# Patient Record
Sex: MALE | Race: WHITE | Employment: OTHER | URBAN - METROPOLITAN AREA
[De-identification: names, ages, dates, MRNs, and addresses within clinical notes are randomized per-mention and may not be internally consistent; named-entity substitution may affect disease eponyms.]

---

## 2022-02-04 ENCOUNTER — APPOINTMENT (OUTPATIENT)
Dept: GENERAL RADIOLOGY | Age: 62
End: 2022-02-04
Attending: PHYSICIAN ASSISTANT
Payer: MEDICARE

## 2022-02-04 ENCOUNTER — HOSPITAL ENCOUNTER (EMERGENCY)
Age: 62
Discharge: HOME | End: 2022-02-04
Attending: EMERGENCY MEDICINE
Payer: MEDICARE

## 2022-02-04 VITALS
SYSTOLIC BLOOD PRESSURE: 145 MMHG | HEART RATE: 81 BPM | BODY MASS INDEX: 24.25 KG/M2 | HEIGHT: 68 IN | RESPIRATION RATE: 16 BRPM | WEIGHT: 160 LBS | DIASTOLIC BLOOD PRESSURE: 91 MMHG | OXYGEN SATURATION: 100 % | TEMPERATURE: 97.5 F
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
## 2022-02-04 DIAGNOSIS — S81.811A LACERATION OF RIGHT LOWER EXTREMITY EXCLUDING THIGH, INITIAL ENCOUNTER: ICD-10-CM

## 2022-02-04 PROCEDURE — 75810000293 HC SIMP/SUPERF WND  RPR

## 2022-02-04 PROCEDURE — 73590 X-RAY EXAM OF LOWER LEG: CPT

## 2022-02-04 PROCEDURE — 99283 EMERGENCY DEPT VISIT LOW MDM: CPT

## 2022-02-04 RX ORDER — ACETAMINOPHEN 500 MG
1000 TABLET ORAL
COMMUNITY

## 2022-02-04 RX ORDER — GABAPENTIN 300 MG/1
300 CAPSULE ORAL 3 TIMES DAILY
COMMUNITY
Start: 2021-03-12
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
## 2022-02-04 NOTE — ED NOTES
Sutures placed to laceration below right knee by GEOFFREY Aparicio. Pt instructed by PA on care and removal after 10-14 days. Pt verbalized understanding.
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
## 2022-02-04 NOTE — ED NOTES
I have reviewed discharge instructions with the patient. The patient verbalized understanding. Patient left ED via Discharge Method: ambulatory to Home with self. Opportunity for questions and clarification provided. Patient given 0 scripts. To continue your aftercare when you leave the hospital, you may receive an automated call from our care team to check in on how you are doing. This is a free service and part of our promise to provide the best care and service to meet your aftercare needs.  If you have questions, or wish to unsubscribe from this service please call [phone]. Thank you for Choosing our [facility] Emergency Department.

## 2022-02-04 NOTE — DISCHARGE INSTRUCTIONS
Apply Neosporin with bandage twice daily. Watch for signs of infection. Do not sit in a hot tub with sutures.   Return in 10 to 14 days for suture removal.

## 2022-09-08 ENCOUNTER — HOSPITAL ENCOUNTER (EMERGENCY)
Age: 62
Discharge: HOME | End: 2022-09-08
Attending: EMERGENCY MEDICINE
Payer: MEDICAID

## 2022-09-08 ENCOUNTER — HOSPITAL ENCOUNTER (EMERGENCY)
Dept: GENERAL RADIOLOGY | Age: 62
Discharge: HOME | End: 2022-09-11
Payer: MEDICAID

## 2022-09-08 VITALS
WEIGHT: 160 LBS | HEIGHT: 68 IN | DIASTOLIC BLOOD PRESSURE: 88 MMHG | TEMPERATURE: 98.5 F | SYSTOLIC BLOOD PRESSURE: 162 MMHG | HEART RATE: 80 BPM | OXYGEN SATURATION: 98 % | BODY MASS INDEX: 24.25 KG/M2 | RESPIRATION RATE: 16 BRPM

## 2022-09-08 DIAGNOSIS — S62.102A CLOSED FRACTURE OF LEFT WRIST, INITIAL ENCOUNTER: Primary | ICD-10-CM
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
## 2022-09-08 PROCEDURE — 99283 EMERGENCY DEPT VISIT LOW MDM: CPT

## 2022-09-08 PROCEDURE — 6370000000 HC RX 637 (ALT 250 FOR IP): Performed by: NURSE PRACTITIONER

## 2022-09-08 PROCEDURE — 73130 X-RAY EXAM OF HAND: CPT

## 2022-09-08 RX ORDER — IBUPROFEN 600 MG/1
600 TABLET ORAL EVERY 6 HOURS PRN
Status: DISCONTINUED | OUTPATIENT
Start: 2022-09-08 | End: 2022-09-08 | Stop reason: HOSPADM

## 2022-09-08 RX ORDER — HYDROCODONE BITARTRATE AND ACETAMINOPHEN 5; 325 MG/1; MG/1
1 TABLET ORAL EVERY 6 HOURS PRN
Qty: 10 TABLET | Refills: 0 | Status: SHIPPED | OUTPATIENT
Start: 2022-09-08 | End: 2022-09-11

## 2022-09-08 RX ORDER — ACETAMINOPHEN 500 MG
1000 TABLET ORAL
Status: COMPLETED | OUTPATIENT
Start: 2022-09-08 | End: 2022-09-08

## 2022-09-08 RX ORDER — OXYCODONE HYDROCHLORIDE 5 MG/1
5 TABLET ORAL EVERY 4 HOURS PRN
Status: DISCONTINUED | OUTPATIENT
Start: 2022-09-08 | End: 2022-09-08 | Stop reason: HOSPADM

## 2022-09-08 RX ORDER — TETANUS AND DIPHTHERIA TOXOIDS ADSORBED 2; 2 [LF]/.5ML; [LF]/.5ML
0.5 INJECTION INTRAMUSCULAR
Status: DISCONTINUED | OUTPATIENT
Start: 2022-09-08 | End: 2022-09-08 | Stop reason: HOSPADM

## 2022-09-08 RX ADMIN — OXYCODONE HYDROCHLORIDE 5 MG: 5 TABLET ORAL at 15:34

## 2022-09-08 RX ADMIN — ACETAMINOPHEN 1000 MG: 500 TABLET, FILM COATED ORAL at 15:34

## 2022-09-08 ASSESSMENT — PAIN SCALES - GENERAL
PAINLEVEL_OUTOF10: 6
PAINLEVEL_OUTOF10: 7

## 2022-09-08 ASSESSMENT — PAIN DESCRIPTION - DESCRIPTORS: DESCRIPTORS: ACHING;THROBBING

## 2022-09-08 ASSESSMENT — PAIN DESCRIPTION - LOCATION: LOCATION: WRIST

## 2022-09-08 ASSESSMENT — PAIN DESCRIPTION - ORIENTATION: ORIENTATION: LEFT

## 2022-09-08 NOTE — ED TRIAGE NOTES
Patient ambulatory to triage with mask in place.  Patient reports he tripped and fell today and injured his left wrist

## 2022-09-08 NOTE — ED NOTES
I have reviewed discharge instructions with the patient. The patient verbalized understanding. Patient left ED via Discharge Method: ambulatory to Home with self. Opportunity for questions and clarification provided. Patient given 1 scripts. To continue your aftercare when you leave the hospital, you may receive an automated call from our care team to check in on how you are doing. This is a free service and part of our promise to provide the best care and service to meet your aftercare needs.  If you have questions, or wish to unsubscribe from this service please call [phone]. Thank you for Choosing our [address] [place] [address] Emergency Department.         Jenni San RN  09/08/22 2507

## 2022-09-08 NOTE — ED PROVIDER NOTES
[facility] Emergency Department Provider Note                   PCP:                VIKKI Valladares               Age: 58 y.o. Sex: male       ICD-10-CM    1. Closed fracture of left wrist, initial encounter  S62.102A HYDROcodone-acetaminophen (NORCO) 5-325 MG per tablet     [facility] Th[facility] Spica, Left      2. Fall from ground level  W18.30XA           DISPOSITION    Discharged    MDM     HPI as charted. Pt neck is supple, no meningeal signs. Lungs are clear to auscultation, no diminished sounds. Abdomen is soft and not tender. Pain or tenderness, weightbearing and ambulatory without any difficulty or pain. No sign of head injury, no neck or back pain, neck and back are nontender. No raccoon eyes or blandon signs, no tenderness, crepitus, instability, hematoma. No chest wall tenderness. Tenderness and swelling at the left wrist and hand, is neurovascular intact, no other sign of injury noted. I have offered tetanus booster which she accepts. Obtain radiograph of the hand and wrist which was notable for fractures as below. He declined offer of any additional imaging or management. Pain is well controlled with analgesia in the ED. I have discussed patient's with Andrew Green orthopedics PA via [facility] and he has recommended that as patient is neurovascularly intact and no open fracture, the patient be placed in Velcro wrist splint and discharged to follow-up with Ortho/hand. I will prescribe a short course of analgesia and discharge. Patient finding contact information for Ortho/hand and advised to call upon discharge today to schedule earliest possible follow-up. Strict return to ED for care instruction provided. Advised to follow-up with PCP in 1-2 days. Return to ED immediately for any new, worsening, concerning symptoms. Patient is very well-hydrated appearing, no distress, pleasant and conversational.  Nontoxic-appearing, tolerating oral intake. Patient is hemodynamically stable and in no acute distress. Patient is not ill-appearing. All findings and plans were discussed with the patient. Patient verbalizes desire to be discharged home at this time. All questions answered. Discussed with the patient that an unremarkable evaluation in the ED does not preclude the development or presence of a serious or life threatening condition. Patient was instructed to return immediately for any worsening or change in current symptoms, or if symptoms do not continue to improve. I instructed them to follow up with their primary care provider, own specialist, or medical provider that I am recommending for him within the next 2-3 days     the patient acknowledged understanding plan of care and affirmed approval. Patient is discharged home, with no further complaint. No orders of the defined types were placed in this encounter. Kati Hummel is a 58 y.o. male who presents to the Emergency Department with chief complaint of  No chief complaint on file. HPI  Pleasant and well-appearing 80-year-old male presents to the ED with complaint of the wrist pain and swelling subsequent to mechanical ground-level fall that occurred earlier this morning. States he was walking out of a convenience store when he tripped and fell onto the pavement, breaking his fall with extending the left hand and wrist.  Localized pain to the left wrist and the base of the hand, denies other pain, denies numbness/tingling/weakness. Denies striking head, loss of conscious, headache, blurred vision, neck or back pain, other pain complaints and all other complaint. Denies syncope or near syncope, dizziness, chest pain, diaphoresis, shortness of breath or prodrome, states he had a mechanical fall. No known therapeutic measures but nothing to make worse or better. Denies fever/chills, change in appetite, weight loss, decreased oral intake, blurred vision, headaches, neck pain/stiffness. Alert & oriented x 3, afebrile, hemodynamically stable, non-toxic appearing, appears in no distress. Medical/surgical/social history reviewed with the patient. Review of Systems  Constitutional: Negative for fever. Negative for appetite change, chills, diaphoresis and unexpected weight change. HENT: As in HPI     Eyes: Negative. Respiratory: As in HPI   Cardiovascular: Negative. GI/: As in HPI      Musculoskeletal: As in HPI   Skin: Negative. Allergic/Immunologic: Negative. Neurological: Negative. No past medical history on file. No past surgical history on file. No family history on file. Social History     Socioeconomic History    Marital status:    Tobacco Use    Smoking status: Every Day     Packs/day: 1.00     Types: Cigarettes    Smokeless tobacco: Never   Substance and Sexual Activity    Alcohol use: Not Currently    Drug use: Yes     Types: Marijuana [place] Semen)         Patient has no allergy information on record. Previous Medications    ACETAMINOPHEN (TYLENOL) 500 MG TABLET    Take 1,000 mg by mouth every 6 hours as needed    GABAPENTIN (NEURONTIN) 300 MG CAPSULE    Take 300 mg by mouth 3 times daily. Vitals signs and nursing note reviewed. No data found. Physical Exam   Constitutional: Oriented to person, place, and time. Appears well-developed and well-nourished. No distress. HENT:    Head: Normocephalic and atraumatic. Right Ear: External ear normal.    Left Ear: External ear normal.    Nose: Nose normal.    Mouth/Throat: Mouth normal. Uvula midline, no erythema/exudates/edema. No drooling, no voice change. No pain with ROM of neck. Eyes: Conjunctivae are normal.   Neck: Supple. No tracheal deviation. Not tender, not rigid, no menningeal signs. Cardiovascular: Normal rate, intact distal pulses. Pulmonary/Chest: No respiratory distress. Abdominal: Soft. Musculoskeletal: No obvious deformity, erythema.   There is swelling of the left wrist, there is tenderness to the left wrist.  Range of motion of the hand is intact, able to make a fist, open and spread fingers, touch finger to thumb, but with pain. He has tenderness to the base of the hand, the wrist, skin is intact, is neurovascularly intact and there is no tenderness of the forearm, elbow, upper arm, shoulder or other. Distal pulses, cap refill less than 2 seconds, no pain or proportion, no pitting edema, no sensory deficit. No Laceration. Neurological: Alert and oriented to person, place, and time. Skin:  No abrasion, no lesion, no petechiae and no rash noted. Not diaphoretic. No cyanosis, erythema, or pallor. Psychiatric: Normal mood and affect. Behavior is normal.    Nursing note and vitals reviewed. Procedures      Labs Reviewed - No data to display     No orders to display                          Voice dictation software was used during the making of this note. This software is not perfect and grammatical and other typographical errors may be present. This note has not been completely proofread for errors.          MADDY Magana - KARINA  09/08/22 1927

## 2022-09-16 ENCOUNTER — OFFICE VISIT (OUTPATIENT)
Dept: ORTHOPEDIC SURGERY | Age: 62
End: 2022-09-16
Payer: MEDICARE

## 2022-09-16 DIAGNOSIS — S52.572A OTHER INTRAARTICULAR FRACTURE OF LOWER END OF LEFT RADIUS, INITIAL ENCOUNTER FOR CLOSED FRACTURE: Primary | ICD-10-CM

## 2022-09-16 PROBLEM — S52.552A OTHER EXTRAARTICULAR FRACTURE OF LOWER END OF LEFT RADIUS, INITIAL ENCOUNTER FOR CLOSED FRACTURE: Status: ACTIVE | Noted: 2022-09-16

## 2022-09-16 PROCEDURE — 99204 OFFICE O/P NEW MOD 45 MIN: CPT | Performed by: ORTHOPAEDIC SURGERY

## 2022-09-16 NOTE — LETTER
[address],[facility]  Phone: [phone]  Fax: [phone]    Cindy Milan MD        September 16, 2022     Patient: Flavio Almonte   YOB: 1960   Date of Visit: 9/16/2022       To Whom It May Concern: It is my medical opinion that Flavio Almonte can return back to work on Monday restricted to no lifting, pushing or pulling over 7 lbs with the left hand for 4 weeks. If you have any questions or concerns, please don't hesitate to call.     Sincerely,        Cindy Milan MD

## 2022-09-16 NOTE — PROGRESS NOTES
Orthopaedic Hand Clinic Note    Name: Kati Hummel  Age: 58 y.o. YOB: 1960  Gender: male  MRN: [id number]      CC: Patient referred for evaluation of hand pain    HPI: Kati Hummel is a 58 y.o. male right handed with a chief complaint of  left wrist pain. The injury occurred 1 week ago when the patient tripped coming out of a [employer] gas station and landed onto the left wrist. The pain is located diffusely about the wrist. Denies numbness or paresthesias of the fingers. Evaluation has included x-rays. Treatment to date has included splint. Denies loss of consciousness, head injury or neck pain. ROS/Meds/PSH/PMH/FH/SH: I personally reviewed the patients standard intake form. Pertinents are discussed in the HPI    Physical Examination:  General: Awake and alert. HEENT: Normocephalic, atraumatic  CV/Pulm: Breathing even and unlabored  Skin: No obvious rashes noted. Lymphatic: No obvious evidence of lymphedema or lymphadenopathy    Musculoskeletal Exam:  Examination of the left upper extremity demonstrates no open wounds. Negative Tinel's over left carpal tunnel. Sensation is intact throughout, cap refill in all fingers < 5 seconds. Finger motion is limited with moderate swelling of the hand and fingers. Tenderness over the distal radius. Positive tenderness over the ulnar aspect of the wrist. No tenderness at elbow, shoulder, clavicle or cervical spine. Imaging / Electrodiagnostic Tests:     XR of the left wrist from outside source was reviewed and independently interpreted, this demonstrates a distal radius fracture with 10 degrees of dorsal tilt, ulnar styloid base fracture, this is a hand x-ray so it is suboptimal for evaluation of the distal radius fracture. left Wrist XR: AP, Lateral, Oblique views     Clinical Indication:  1.  Other intraarticular fracture of lower end of left radius, initial encounter for closed fracture           Report: AP, lateral, oblique x-ray wrist XRs demonstrates distal radius fracture with 20 degrees of dorsal tilt and intra-articular extension with 2 mm step-off, ulnar styloid base fracture is again demonstrated    Impression: as above     Sabino Blanco MD         Assessment:   1. Other intraarticular fracture of lower end of left radius, initial encounter for closed fracture        Plan:   We discussed the diagnosis and different treatment options. We discussed observation, casting, further imaging, and surgical fixation and the risks, benefits and alternatives of all these options. After discussing in detail the patient elects to proceed with wrist brace, we will reassess in 1 week with repeat x-rays, I explained that he has a distal radius fracture with 20 degrees of dorsal tilt which is an indication for surgery, we also discussed that he has an articular step-off of 2 mm, we discussed distal radius fracture open reduction internal fixation surgery, however, patient reports that he is low demand and he would like to try conservative measures. Patient voiced accordance and understanding of the information provided and the formulated plan. All questions were answered to the patient's satisfaction during the encounter.     Sabino Blanco MD  Orthopaedic Surgery  09/16/22  10:47 AM

## 2022-09-23 ENCOUNTER — OFFICE VISIT (OUTPATIENT)
Dept: ORTHOPEDIC SURGERY | Age: 62
End: 2022-09-23
Payer: MEDICARE

## 2022-09-23 DIAGNOSIS — S52.572A OTHER INTRAARTICULAR FRACTURE OF LOWER END OF LEFT RADIUS, INITIAL ENCOUNTER FOR CLOSED FRACTURE: Primary | ICD-10-CM

## 2022-09-23 PROCEDURE — 99215 OFFICE O/P EST HI 40 MIN: CPT | Performed by: ORTHOPAEDIC SURGERY

## 2022-09-23 RX ORDER — PREGABALIN 150 MG/1
CAPSULE ORAL NIGHTLY
COMMUNITY
Start: 2022-08-28

## 2022-09-23 RX ORDER — GABAPENTIN 300 MG/1
300 CAPSULE ORAL 3 TIMES DAILY PRN
COMMUNITY

## 2022-09-23 RX ORDER — BACLOFEN 20 MG/1
TABLET ORAL NIGHTLY
COMMUNITY
Start: 2022-07-06

## 2022-09-23 NOTE — PERIOP NOTE
Patient verified name and . Order for consent was found in EHR and matches case posting; patient verifies procedure. Type 1B surgery, phone assessment complete. Orders were received. Labs per surgeon: none  Labs per anesthesia protocol: none    Patient answered medical/surgical history questions at their best of ability. All prior to admission medications documented in [facility]. Patient instructed to take the following medications the day of surgery according to anesthesia guidelines with a small sip of water: mehtadone. On the day before surgery please take Acetaminophen 1000mg in the morning and then again before bed. You may substitute for Tylenol 650 mg. Hold all vitamins 7 days prior to surgery and NSAIDS 5 days prior to surgery. Prescription meds to hold:none  Patient instructed on the following:    > Arrive at [facility], time of arrival to be called the day before by 1700  > NPO after midnight, unless otherwise indicated, including gum, mints, and ice chips  > Responsible adult must drive patient to the hospital, stay during surgery, and patient will need supervision 24 hours after anesthesia  > Use soap in shower the night before surgery and on the morning of surgery  > All piercings must be removed prior to arrival.    > Leave all valuables (money and jewelry) at home but bring insurance card and ID on DOS.   > You may be required to pay a deductible or co-pay on the day of your procedure. You can pre-pay by calling [phone] if your surgery is at the [facility] or [phone] if your surgery is at the [facility]. > Do not wear make-up, nail polish, lotions, cologne, perfumes, powders, or oil on skin. Artificial nails are not permitted.

## 2022-09-23 NOTE — PROGRESS NOTES
Orthopaedic Hand Clinic Note    Name: Sangita Jennings  Age: 58 y.o. YOB: 1960  Gender: male  MRN: [id number]      Follow up visit:   1. Other intraarticular fracture of lower end of left radius, initial encounter for closed fracture        HPI: Sangita Jennings is a 58 y.o. male who is following up for left distal radius fracture almost 2 weeks ago, he reports increased swelling and pain, severe difficulty with everyday life due to the injury. ROS/Meds/PSH/PMH/FH/SH: I personally reviewed the patients standard intake form. Pertinents are discussed in the HPI    Physical Examination:  General: Awake and alert. HEENT: Normocephalic, atraumatic  CV/Pulm: Breathing even and unlabored  Skin: No obvious rashes noted. Lymphatic: No obvious evidence of lymphedema or lymphadenopathy    Musculoskeletal Examination:  Examination on the left upper extremity demonstrates cap refill < 5 seconds in all fingers, moderate to severe swelling of the left wrist, very limited motion, tenderness palpation of the distal radius, negative Tinel at the carpal tunnel, normal sensation in all fingers. Imaging / Electrodiagnostic Tests:     left Wrist XR: AP, Lateral, Oblique views     Clinical Indication:  1. Other intraarticular fracture of lower end of left radius, initial encounter for closed fracture           Report: AP, lateral, oblique x-ray wrist XRs demonstrates intra-articular distal radius fracture with 50 degrees of dorsal tilt which is increased from 20 degrees a week ago    Impression: as above     dIa Feliz MD         Assessment:   1. Other intraarticular fracture of lower end of left radius, initial encounter for closed fracture        Plan:   We discussed the diagnosis and different treatment options. We discussed observation, therapy, antiinflammatory medications and other pertinent treatment modalities.     After discussing in detail the patient elects to proceed with left distal radius open reduction internal fixation. We discussed all treatment options for this fracture, his fracture has displaced significantly in 1 week, at this point he has 50 degrees of dorsal angulation, this will cause adaptive carpal instability with very high probability of chronic pain, after discussion of all treatment options patient elected to proceed with surgery. Patient understands risks and benefits of LEFT DISTAL RADIUS OPEN REDUCTION INTERNAL FIXATION including but not limited to nerve injury, vessel injury, infection, failure to achieve desired results and possible need for additional surgery. Patient understands and wishes to proceed with surgery. On Exam:   The patient is alert and oriented; ;   Lung auscultation is clear bilaterally   Heart has RRR without murmurs  . Patient voiced accordance and understanding of the information provided and the formulated plan. All questions were answered to the patient's satisfaction during the encounter.     Richa Marrufo MD  Orthopaedic Surgery  09/23/22  11:05 AM

## 2022-09-23 NOTE — DISCHARGE INSTRUCTIONS
Postoperative  Instructions:      Weightbearing or Lifting: You  are  not  allowed  to  lift  any  weight  or  bear  any  weight  on  the  surgical  extremity  until  cleared  by  your  surgeon. Dressing  instructions:    Keep  your  dressing  and/or  splint  clean  and  dry  at  all  times. It  will  be  removed  at  your  first  post-operative  appointment or therapy apppointment. Your  stitches  will  be  removed  at  this  visit. Showering  Instructions:  May  shower  But keep surgical dressing clean and dry until removed as explained above. Pain  Control:  - You  have  been  given  a  prescription  to  be  taken  as  directed  for  post-operative  pain  control. In  addition,  elevate  the  operative  extremity  above  the  heart  at  all  times  to  prevent  swelling  and  throbbing  pain. - If you develop constipation while taking narcotic pain medications (Norco, Hydrocodone, Percocet, Oxycodone, Dilaudid, Hydromorphone) take  over-the-counter  Colace,  100mg  by  mouth  twice  a  Day. - Nausea  is  a  common  side  effect  of  many  pain  medications. You  will  want  to  eat something  before  taking  your  pain  medicine  to  help  prevent  Nausea. - If  you  are  taking  a  prescription  pain  medication  that  contains  acetaminophen,  we  recommend  that  you  do  not  take  additional  over  the  counter  acetaminophen  (Tylenol®). Other  pain  relieving  options:   - Using  a  cold  pack  to  ice  the  affected  area  a  few  times  a  day  (15  to  20  minutes  at  a  time)  can  help  to  relieve  pain,  reduce  swelling  and  bruising.      - Elevation  of  the  affected  area  can  also  help  to  reduce  pain  and  swelling. Did  you  receive  a  nerve  Block? A  nerve  block  can  provide  pain  relief  for  one  hour  to  two  days  after  your  surgery.   As  long  as  the  nerve  block  is  working,  you  will  experience  little  or  no  sensation in  the  area  the  surgeon  operated  on. As  the  nerve  block  wears  off,  you  will  begin  to  experience  pain  or  discomfort. It  is  very  important  that  you  begin  taking  your  prescribed  pain  medication  before  the  nerve  block  fully  wears  off. The first sign that the nerve block is wearing off is tingling in your fingers. Treating  your  pain  at  the  first  sign  of  the  block  wearing  off  will  ensure  your  pain  is  better  controlled  and  more  tolerable  when  full-sensation  returns. Do  not  wait  until  the  pain  is  intolerable,  as  the  medicine  will  be  less  effective. It  is  better  to  treat  pain  in  advance  than  to  try  and  catch  up. General  Anesthesia or Sedation:      If  you  did  not  receive  a  nerve  block  during  your  surgery,  you  will  need  to  start  taking  your  pain  medication  shortly  after  your  surgery  and  should  continue  to  do  so  as  prescribed  by  your  surgeon. Please contact us through my chart or call  [phone]  with any concern and ask to speak with Dr Mario goins.    Concerning problems include:      -  Excessive  redness  of  the  incisions      -  Drainage  for  more  than  2  Days after surgery or any foul smelling drainage  -  Fever  of  more  than  101.5  F      Please  call  [phone]  if  you  do  not  receive  or  are  unsure  of  your  first  follow-up  appointment. You  should  see  the  doctor  10-14  days  after  your  Surgery. Thank you for choosing me and [address] [place] [address] for your care. I will go above and beyond to ensure you receive the best care possible. Serafin Humphrey MD, PhD    ACTIVITY  As tolerated and as directed by your doctor. Bathe or shower as directed by your doctor. DIET  Clear liquids until no nausea or vomiting; then light diet for the first day. Advance to regular diet on second day, unless your doctor orders otherwise.    If nausea and vomiting continues, call your doctor. PAIN  Take pain medication as directed by your doctor. Call your doctor if pain is NOT relieved by medication. DO NOT take aspirin of blood thinners unless directed by your doctor. DRESSING CARE       CALL YOUR DOCTOR IF   Excessive bleeding that does not stop after holding pressure over the area  Temperature of 101 degrees F or above  Excessive redness, swelling or bruising, and/ or green or yellow, smelly discharge from incision    AFTER ANESTHESIA   For the first 24 hours: DO NOT Drive, Drink alcoholic beverages, or Make important decisions. Be aware of dizziness following anesthesia and while taking pain medication. APPOINTMENT DATE/ TIME    YOUR DOCTOR'S PHONE NUMBER       DISCHARGE SUMMARY from Nurse    PATIENT INSTRUCTIONS:    After general anesthesia or intravenous sedation, for 24 hours or while taking prescription Narcotics:  Limit your activities  Do not drive and operate hazardous machinery  Do not make important personal or business decisions  Do  not drink alcoholic beverages  If you have not urinated within 8 hours after discharge, please contact your surgeon on call. *  Please give a list of your current medications to your Primary Care Provider. *  Please update this list whenever your medications are discontinued, doses are      changed, or new medications (including over-the-counter products) are added. *  Please carry medication information at all times in case of emergency situations. These are general instructions for a healthy lifestyle:    No smoking/ No tobacco products/ Avoid exposure to second hand smoke    Surgeon General's Warning:  Quitting smoking now greatly reduces serious risk to your health.     Obesity, smoking, and sedentary lifestyle greatly increases your risk for illness    A healthy diet, regular physical exercise & weight monitoring are important for maintaining a healthy lifestyle    You may be retaining fluid if you have a history of heart failure or if you experience any of the following symptoms:  Weight gain of 3 pounds or more overnight or 5 pounds in a week, increased swelling in our hands or feet or shortness of breath while lying flat in bed. Please call your doctor as soon as you notice any of these symptoms; do not wait until your next office visit. Recognize signs and symptoms of STROKE:    F-face looks uneven    A-arms unable to move or move unevenly    S-speech slurred or non-existent    T-time-call 911 as soon as signs and symptoms begin-DO NOT go       Back to bed or wait to see if you get better-TIME IS BRAIN.

## 2022-09-23 NOTE — H&P (VIEW-ONLY)
[facility] Note    Name: Sandra Hussein  Age: 58 y.o. YOB: 1960  Gender: male  MRN: [id number]      Follow up visit:   1. Other intraarticular fracture of lower end of left radius, initial encounter for closed fracture        HPI: Sandra Hussein is a 58 y.o. male who is following up for left distal radius fracture almost 2 weeks ago, he reports increased swelling and pain, severe difficulty with everyday life due to the injury. ROS/Meds/PSH/PMH/FH/SH: I personally reviewed the patients standard intake form. Pertinents are discussed in the HPI    Physical Examination:  General: Awake and alert. HEENT: Normocephalic, atraumatic  CV/Pulm: Breathing even and unlabored  Skin: No obvious rashes noted. Lymphatic: No obvious evidence of lymphedema or lymphadenopathy    Musculoskeletal Examination:  Examination on the left upper extremity demonstrates cap refill < 5 seconds in all fingers, moderate to severe swelling of the left wrist, very limited motion, tenderness palpation of the distal radius, negative Tinel at the carpal tunnel, normal sensation in all fingers. Imaging / Electrodiagnostic Tests:     left Wrist XR: AP, Lateral, Oblique views     Clinical Indication:  1. Other intraarticular fracture of lower end of left radius, initial encounter for closed fracture           Report: AP, lateral, oblique x-ray wrist XRs demonstrates intra-articular distal radius fracture with 50 degrees of dorsal tilt which is increased from 20 degrees a week ago    Impression: as above     Richa Marrufo MD         Assessment:   1. Other intraarticular fracture of lower end of left radius, initial encounter for closed fracture        Plan:   We discussed the diagnosis and different treatment options. We discussed observation, therapy, antiinflammatory medications and other pertinent treatment modalities.     After discussing in detail the patient elects to proceed with left distal radius open reduction internal fixation. We discussed all treatment options for this fracture, his fracture has displaced significantly in 1 week, at this point he has 50 degrees of dorsal angulation, this will cause adaptive carpal instability with very high probability of chronic pain, after discussion of all treatment options patient elected to proceed with surgery. Patient understands risks and benefits of LEFT DISTAL RADIUS OPEN REDUCTION INTERNAL FIXATION including but not limited to nerve injury, vessel injury, infection, failure to achieve desired results and possible need for additional surgery. Patient understands and wishes to proceed with surgery. On Exam:   The patient is alert and oriented; ;   Lung auscultation is clear bilaterally   Heart has RRR without murmurs  . Patient voiced accordance and understanding of the information provided and the formulated plan. All questions were answered to the patient's satisfaction during the encounter.     Melchor Montalvo MD  Orthopaedic Surgery  09/23/22  11:05 AM

## 2022-09-25 ENCOUNTER — ANESTHESIA EVENT (OUTPATIENT)
Dept: SURGERY | Age: 62
End: 2022-09-25
Payer: MEDICARE

## 2022-09-26 ENCOUNTER — APPOINTMENT (OUTPATIENT)
Dept: GENERAL RADIOLOGY | Age: 62
End: 2022-09-26
Attending: ORTHOPAEDIC SURGERY
Payer: MEDICARE

## 2022-09-26 ENCOUNTER — ANESTHESIA (OUTPATIENT)
Dept: SURGERY | Age: 62
End: 2022-09-26
Payer: MEDICARE

## 2022-09-26 ENCOUNTER — HOSPITAL ENCOUNTER (OUTPATIENT)
Age: 62
Setting detail: OUTPATIENT SURGERY
Discharge: HOME | End: 2022-09-26
Attending: ORTHOPAEDIC SURGERY | Admitting: ORTHOPAEDIC SURGERY
Payer: MEDICARE

## 2022-09-26 VITALS
HEART RATE: 54 BPM | BODY MASS INDEX: 23.54 KG/M2 | WEIGHT: 150 LBS | RESPIRATION RATE: 16 BRPM | TEMPERATURE: 98 F | SYSTOLIC BLOOD PRESSURE: 133 MMHG | OXYGEN SATURATION: 93 % | HEIGHT: 67 IN | DIASTOLIC BLOOD PRESSURE: 74 MMHG

## 2022-09-26 PROCEDURE — 3600000014 HC SURGERY LEVEL 4 ADDTL 15MIN: Performed by: ORTHOPAEDIC SURGERY

## 2022-09-26 PROCEDURE — 2720000010 HC SURG SUPPLY STERILE: Performed by: ORTHOPAEDIC SURGERY

## 2022-09-26 PROCEDURE — 3600000004 HC SURGERY LEVEL 4 BASE: Performed by: ORTHOPAEDIC SURGERY

## 2022-09-26 PROCEDURE — 7100000011 HC PHASE II RECOVERY - ADDTL 15 MIN: Performed by: ORTHOPAEDIC SURGERY

## 2022-09-26 PROCEDURE — 25609 OPTX DST RD XART FX/EP SEP3+: CPT | Performed by: ORTHOPAEDIC SURGERY

## 2022-09-26 PROCEDURE — 3700000000 HC ANESTHESIA ATTENDED CARE: Performed by: ORTHOPAEDIC SURGERY

## 2022-09-26 PROCEDURE — 7100000010 HC PHASE II RECOVERY - FIRST 15 MIN: Performed by: ORTHOPAEDIC SURGERY

## 2022-09-26 PROCEDURE — 2580000003 HC RX 258: Performed by: ANESTHESIOLOGY

## 2022-09-26 PROCEDURE — 6360000002 HC RX W HCPCS: Performed by: ANESTHESIOLOGY

## 2022-09-26 PROCEDURE — C1713 ANCHOR/SCREW BN/BN,TIS/BN: HCPCS | Performed by: ORTHOPAEDIC SURGERY

## 2022-09-26 PROCEDURE — 3700000001 HC ADD 15 MINUTES (ANESTHESIA): Performed by: ORTHOPAEDIC SURGERY

## 2022-09-26 PROCEDURE — 2500000003 HC RX 250 WO HCPCS

## 2022-09-26 PROCEDURE — 6370000000 HC RX 637 (ALT 250 FOR IP): Performed by: ANESTHESIOLOGY

## 2022-09-26 PROCEDURE — 2709999900 HC NON-CHARGEABLE SUPPLY: Performed by: ORTHOPAEDIC SURGERY

## 2022-09-26 PROCEDURE — 6360000002 HC RX W HCPCS

## 2022-09-26 PROCEDURE — 64415 NJX AA&/STRD BRCH PLXS IMG: CPT | Performed by: ANESTHESIOLOGY

## 2022-09-26 PROCEDURE — 7100000000 HC PACU RECOVERY - FIRST 15 MIN: Performed by: ORTHOPAEDIC SURGERY

## 2022-09-26 PROCEDURE — 6360000002 HC RX W HCPCS: Performed by: NURSE PRACTITIONER

## 2022-09-26 DEVICE — IMPLANTABLE DEVICE: Type: IMPLANTABLE DEVICE | Site: WRIST | Status: FUNCTIONAL

## 2022-09-26 DEVICE — SCREW BONE L15MM DIA3.5MM CORT DSTL RAD VOLAR TI NONLOCKING: Type: IMPLANTABLE DEVICE | Site: WRIST | Status: FUNCTIONAL

## 2022-09-26 DEVICE — PEG BNE FIX L20MM DIA2.3MM DST RAD TI THRD LOK FOR VOLAR: Type: IMPLANTABLE DEVICE | Site: WRIST | Status: FUNCTIONAL

## 2022-09-26 DEVICE — SCREW BNE L14MM DIA3.5MM CORT DST RAD TI LOK FOR VOLAR: Type: IMPLANTABLE DEVICE | Site: WRIST | Status: FUNCTIONAL

## 2022-09-26 DEVICE — PEG BNE FIX L22MM DIA2.3MM DST RAD TI THRD LOK FOR VOLAR: Type: IMPLANTABLE DEVICE | Site: WRIST | Status: FUNCTIONAL

## 2022-09-26 DEVICE — PLATE BNE 3 H L DST RAD VOLAR TI STD FOR 3.5MM SCR GEMINUS: Type: IMPLANTABLE DEVICE | Site: WRIST | Status: FUNCTIONAL

## 2022-09-26 DEVICE — SCREW BNE L14MM DIA3.5MM CORT DST RAD VOLAR TI NONLOCKING: Type: IMPLANTABLE DEVICE | Site: WRIST | Status: FUNCTIONAL

## 2022-09-26 DEVICE — PEG BNE FIX L18MM DIA2.3MM DST RAD TI THRD LOK FOR VOLAR: Type: IMPLANTABLE DEVICE | Site: WRIST | Status: FUNCTIONAL

## 2022-09-26 DEVICE — PEG BNE FIX L24MM DIA2.3MM DST RAD TI THRD LOK FOR VOLAR: Type: IMPLANTABLE DEVICE | Site: WRIST | Status: FUNCTIONAL

## 2022-09-26 RX ORDER — ROPIVACAINE HYDROCHLORIDE 5 MG/ML
INJECTION, SOLUTION EPIDURAL; INFILTRATION; PERINEURAL
Status: COMPLETED | OUTPATIENT
Start: 2022-09-26 | End: 2022-09-26

## 2022-09-26 RX ORDER — OXYCODONE HYDROCHLORIDE 5 MG/1
5 TABLET ORAL EVERY 4 HOURS PRN
Qty: 28 TABLET | Refills: 0 | Status: SHIPPED | OUTPATIENT
Start: 2022-09-26 | End: 2022-10-01

## 2022-09-26 RX ORDER — OXYCODONE HYDROCHLORIDE 5 MG/1
10 TABLET ORAL PRN
Status: DISCONTINUED | OUTPATIENT
Start: 2022-09-26 | End: 2022-09-26 | Stop reason: HOSPADM

## 2022-09-26 RX ORDER — ONDANSETRON 4 MG/1
4 TABLET, ORALLY DISINTEGRATING ORAL EVERY 8 HOURS PRN
Qty: 20 TABLET | Refills: 0 | Status: SHIPPED | OUTPATIENT
Start: 2022-09-26

## 2022-09-26 RX ORDER — EPHEDRINE SULFATE/0.9% NACL/PF 50 MG/5 ML
SYRINGE (ML) INTRAVENOUS PRN
Status: DISCONTINUED | OUTPATIENT
Start: 2022-09-26 | End: 2022-09-26 | Stop reason: SDUPTHER

## 2022-09-26 RX ORDER — SODIUM CHLORIDE 0.9 % (FLUSH) 0.9 %
5-40 SYRINGE (ML) INJECTION EVERY 12 HOURS SCHEDULED
Status: DISCONTINUED | OUTPATIENT
Start: 2022-09-26 | End: 2022-09-26 | Stop reason: HOSPADM

## 2022-09-26 RX ORDER — MIDAZOLAM HYDROCHLORIDE 2 MG/2ML
2 INJECTION, SOLUTION INTRAMUSCULAR; INTRAVENOUS
Status: COMPLETED | OUTPATIENT
Start: 2022-09-26 | End: 2022-09-26

## 2022-09-26 RX ORDER — SODIUM CHLORIDE, SODIUM LACTATE, POTASSIUM CHLORIDE, CALCIUM CHLORIDE 600; 310; 30; 20 MG/100ML; MG/100ML; MG/100ML; MG/100ML
INJECTION, SOLUTION INTRAVENOUS CONTINUOUS
Status: DISCONTINUED | OUTPATIENT
Start: 2022-09-26 | End: 2022-09-26 | Stop reason: HOSPADM

## 2022-09-26 RX ORDER — SODIUM CHLORIDE 0.9 % (FLUSH) 0.9 %
5-40 SYRINGE (ML) INJECTION PRN
Status: DISCONTINUED | OUTPATIENT
Start: 2022-09-26 | End: 2022-09-26 | Stop reason: HOSPADM

## 2022-09-26 RX ORDER — LIDOCAINE HYDROCHLORIDE 20 MG/ML
INJECTION, SOLUTION EPIDURAL; INFILTRATION; INTRACAUDAL; PERINEURAL PRN
Status: DISCONTINUED | OUTPATIENT
Start: 2022-09-26 | End: 2022-09-26 | Stop reason: SDUPTHER

## 2022-09-26 RX ORDER — FENTANYL CITRATE 50 UG/ML
100 INJECTION, SOLUTION INTRAMUSCULAR; INTRAVENOUS
Status: COMPLETED | OUTPATIENT
Start: 2022-09-26 | End: 2022-09-26

## 2022-09-26 RX ORDER — HYDROMORPHONE HYDROCHLORIDE 2 MG/ML
0.5 INJECTION, SOLUTION INTRAMUSCULAR; INTRAVENOUS; SUBCUTANEOUS EVERY 5 MIN PRN
Status: DISCONTINUED | OUTPATIENT
Start: 2022-09-26 | End: 2022-09-26 | Stop reason: HOSPADM

## 2022-09-26 RX ORDER — ONDANSETRON 2 MG/ML
4 INJECTION INTRAMUSCULAR; INTRAVENOUS
Status: DISCONTINUED | OUTPATIENT
Start: 2022-09-26 | End: 2022-09-26 | Stop reason: HOSPADM

## 2022-09-26 RX ORDER — SODIUM CHLORIDE 9 MG/ML
INJECTION, SOLUTION INTRAVENOUS PRN
Status: DISCONTINUED | OUTPATIENT
Start: 2022-09-26 | End: 2022-09-26 | Stop reason: HOSPADM

## 2022-09-26 RX ORDER — ACETAMINOPHEN 500 MG
1000 TABLET ORAL ONCE
Status: COMPLETED | OUTPATIENT
Start: 2022-09-26 | End: 2022-09-26

## 2022-09-26 RX ORDER — OXYCODONE HYDROCHLORIDE 5 MG/1
5 TABLET ORAL PRN
Status: DISCONTINUED | OUTPATIENT
Start: 2022-09-26 | End: 2022-09-26 | Stop reason: HOSPADM

## 2022-09-26 RX ORDER — PROPOFOL 10 MG/ML
INJECTION, EMULSION INTRAVENOUS PRN
Status: DISCONTINUED | OUTPATIENT
Start: 2022-09-26 | End: 2022-09-26 | Stop reason: SDUPTHER

## 2022-09-26 RX ORDER — DIPHENHYDRAMINE HYDROCHLORIDE 50 MG/ML
12.5 INJECTION INTRAMUSCULAR; INTRAVENOUS
Status: DISCONTINUED | OUTPATIENT
Start: 2022-09-26 | End: 2022-09-26 | Stop reason: HOSPADM

## 2022-09-26 RX ADMIN — PROPOFOL 40 MG: 10 INJECTION, EMULSION INTRAVENOUS at 10:32

## 2022-09-26 RX ADMIN — ACETAMINOPHEN 1000 MG: 500 TABLET, FILM COATED ORAL at 08:51

## 2022-09-26 RX ADMIN — LIDOCAINE HYDROCHLORIDE 40 MG: 20 INJECTION, SOLUTION EPIDURAL; INFILTRATION; INTRACAUDAL; PERINEURAL at 10:32

## 2022-09-26 RX ADMIN — Medication 2000 MG: at 10:31

## 2022-09-26 RX ADMIN — SODIUM CHLORIDE, POTASSIUM CHLORIDE, SODIUM LACTATE AND CALCIUM CHLORIDE: 600; 310; 30; 20 INJECTION, SOLUTION INTRAVENOUS at 08:52

## 2022-09-26 RX ADMIN — MIDAZOLAM HYDROCHLORIDE 2 MG: 1 INJECTION, SOLUTION INTRAMUSCULAR; INTRAVENOUS at 08:55

## 2022-09-26 RX ADMIN — Medication 10 MG: at 10:57

## 2022-09-26 RX ADMIN — FENTANYL CITRATE 100 MCG: 50 INJECTION, SOLUTION INTRAMUSCULAR; INTRAVENOUS at 08:55

## 2022-09-26 RX ADMIN — ROPIVACAINE HYDROCHLORIDE 30 ML: 5 INJECTION, SOLUTION EPIDURAL; INFILTRATION; PERINEURAL at 08:55

## 2022-09-26 RX ADMIN — PROPOFOL 140 MCG/KG/MIN: 10 INJECTION, EMULSION INTRAVENOUS at 10:33

## 2022-09-26 NOTE — ANESTHESIA POSTPROCEDURE EVALUATION
Department of Anesthesiology  Postprocedure Note    Patient: Genny Beyer  MRN: [id number]  YOB: 1960  Date of evaluation: 9/26/2022      Procedure Summary     Date: 09/26/22 Room / Location: [facility] OP OR 05 / [facility]    Anesthesia Start: 1026 Anesthesia Stop: 1139    Procedure: LEFT DISTAL RADIUS FRACTURE OPEN REDUCTION INTERNAL FIXATION (Left: Wrist) Diagnosis:       Other closed intra-articular fracture of distal end of left radius, initial encounter      (Other closed intra-articular fracture of distal end of left radius, initial encounter [S52.572A])    Surgeons: Jose Eduardo Dasilva MD Responsible Provider: Chris Molina MD    Anesthesia Type: TIVA ASA Status: 2          Anesthesia Type: No value filed.     Jude Phase I: Jude Score: 7    Jude Phase II:        Anesthesia Post Evaluation    Patient location during evaluation: PACU  Patient participation: complete - patient participated  Level of consciousness: awake and alert  Airway patency: patent  Nausea & Vomiting: no nausea and no vomiting  Complications: no  Cardiovascular status: hemodynamically stable  Respiratory status: acceptable, nonlabored ventilation and spontaneous ventilation  Hydration status: euvolemic  Comments: BP (!) 145/88   Pulse 54   Temp 98 °F (36.7 °C)   Resp 14   Ht 5' 7\" (1.702 m)   Wt 150 lb (68 kg)   SpO2 94%   BMI 23.49 kg/m²     Multimodal analgesia pain management approach

## 2022-09-26 NOTE — INTERVAL H&P NOTE
H&P Update:  Deshaun Rivers was seen and examined. History and physical has been reviewed. The patient has been examined.  There have been no significant clinical changes since the completion of the originally dated History and Physical.    Lidia Gaston MD  Orthopaedic Surgery  09/26/22  8:43 AM

## 2022-09-26 NOTE — ANESTHESIA PROCEDURE NOTES
Peripheral Block    Patient location during procedure: pre-op  Reason for block: post-op pain management and at surgeon's request  Start time: 9/26/2022 8:55 AM  End time: 9/26/2022 8:59 AM  Staffing  Performed: anesthesiologist   Anesthesiologist: Jaime Dave MD  Preanesthetic Checklist  Completed: patient identified, IV checked, site marked, risks and benefits discussed, surgical/procedural consents, equipment checked, pre-op evaluation, timeout performed, anesthesia consent given, oxygen available and monitors applied/VS acknowledged  Peripheral Block   Patient position: sitting  Prep: ChloraPrep  Provider prep: mask and sterile gloves  Patient monitoring: cardiac monitor, continuous pulse ox, oxygen, IV access, frequent blood pressure checks and responsive to questions  Block type: Brachial plexus  Supraclavicular  Laterality: left  Injection technique: single-shot  Guidance: nerve stimulator and ultrasound guided    Needle   Needle type: insulated echogenic nerve stimulator needle   Needle gauge: 20 G  Needle localization: nerve stimulator and ultrasound guidance (minimal motor response at >0.4 mA)  Needle length: 10 cm  Assessment   Injection assessment: negative aspiration for heme, no paresthesia on injection, local visualized surrounding nerve on ultrasound and no intravascular symptoms  Slow fractionated injection: yes  Hemodynamics: stable  Real-time US image taken/store: yes  Outcomes: patient tolerated procedure well    Additional Notes  Risks/benefits/alternatives discussed including damage to nerve or muscle. Needle inserted and placed in close proximity to the nerve under real time ultrasound guidance. Ultrasound was used to visualize the spread of local anesthetic in increments of 2cc to 5cc in close proximity to the nerve being blocked. Patient tolerated procedure well with no immediate complications.   Image saved to chart    Decadron 4mg added to local anesthetic solution as adjunct  Medications Administered  ropivacaine (NAROPIN) injection 0.5% - Perineural   30 mL - 9/26/2022 8:55:00 AM

## 2022-09-26 NOTE — ANESTHESIA PRE PROCEDURE
Department of Anesthesiology  Preprocedure Note       Name:  Geena De Luna   Age:  58 y.o.  :  1960                                          MRN:  [id number]         Date:  2022      Surgeon: Citlali Malhotra):  Sarah Tran MD    Procedure: Procedure(s):  LEFT DISTAL RADIUS FRACTURE OPEN REDUCTION INTERNAL FIXATION    Medications prior to admission:   Prior to Admission medications    Medication Sig Start Date End Date Taking? Authorizing Provider   gabapentin (NEURONTIN) 300 MG capsule Take 300 mg by mouth 3 times daily as needed. Yes Historical Provider, MD   pregabalin (LYRICA) 150 MG capsule nightly.  22   Historical Provider, MD   baclofen (LIORESAL) 20 MG tablet nightly 22   Historical Provider, MD   acetaminophen (TYLENOL) 500 MG tablet Take 1,000 mg by mouth every 6 hours as needed    Ar Automatic Reconciliation       Current medications:    Current Facility-Administered Medications   Medication Dose Route Frequency Provider Last Rate Last Admin    ceFAZolin (ANCEF) 2000 mg in sterile water 20 mL IV syringe  2,000 mg IntraVENous On Call to [address], APRN - CNP        sodium chloride flush 0.9 % injection 5-40 mL  5-40 mL IntraVENous 2 times per day Shiva Dasilva, APRN - CNP        sodium chloride flush 0.9 % injection 5-40 mL  5-40 mL IntraVENous PRN Kira Cabrera APRN - CNP        0.9 % sodium chloride infusion   IntraVENous PRN Yvonne Cabrera APRN - CNP        acetaminophen (TYLENOL) tablet 1,000 mg  1,000 mg Oral Once Hien Milton MD        fentaNYL (SUBLIMAZE) injection 100 mcg  100 mcg IntraVENous Once PRN Hien Milton MD        lactated ringers infusion   IntraVENous Continuous Hien Milton MD        sodium chloride flush 0.9 % injection 5-40 mL  5-40 mL IntraVENous 2 times per day Hien Milton MD        sodium chloride flush 0.9 % injection 5-40 mL  5-40 mL IntraVENous PRN Hien Milton MD        midazolam PF (VERSED) injection 2 mg  2 mg IntraVENous Once PRN aJycob Damon MD           Allergies:  No Known Allergies    Problem List:    Patient Active Problem List   Diagnosis Code    Other intraarticular fracture of lower end of left radius, initial encounter for closed fracture S52.572A       Past Medical History:  History reviewed. No pertinent past medical history. Past Surgical History:        Procedure Laterality Date    BACK SURGERY      KNEE ARTHROSCOPY Left        Social History:    Social History     Tobacco Use    Smoking status: Every Day     Packs/day: 1.00     Types: Cigarettes    Smokeless tobacco: Never   Substance Use Topics    Alcohol use: Not Currently                                Ready to quit: Not Answered  Counseling given: Not Answered      Vital Signs (Current):   Vitals:    09/23/22 1400   Weight: 150 lb (68 kg)   Height: 5' 7\" (1.702 m)                                              BP Readings from Last 3 Encounters:   09/08/22 (!) 162/88       NPO Status:                                                                                 BMI:   Wt Readings from Last 3 Encounters:   09/23/22 150 lb (68 kg)   09/08/22 160 lb (72.6 kg)     Body mass index is 23.49 kg/m².     CBC:   Lab Results   Component Value Date/Time    WBC 13.6 07/30/2020 03:43 PM    RBC 3.57 07/30/2020 03:43 PM    HGB 8.9 07/30/2020 03:43 PM    HCT 29.3 07/30/2020 03:43 PM    MCV 82.1 07/30/2020 03:43 PM    RDW 15.5 07/30/2020 03:43 PM     07/30/2020 03:43 PM       CMP:   Lab Results   Component Value Date/Time     07/30/2020 03:43 PM    K 2.9 07/30/2020 03:43 PM     07/30/2020 03:43 PM    CO2 32 07/30/2020 03:43 PM    BUN 10 07/30/2020 03:43 PM    CREATININE 0.84 07/30/2020 03:43 PM    GFRAA >60 07/30/2020 03:43 PM    AGRATIO 0.7 07/30/2020 03:43 PM    GLUCOSE 110 07/30/2020 03:43 PM    PROT 7.5 07/30/2020 03:43 PM    CALCIUM 8.8 07/30/2020 03:43 PM    BILITOT 0.19 07/30/2020 03:43 PM    ALKPHOS 132 07/30/2020 03:43 PM    AST 14 07/30/2020 03:43 PM    ALT 13 07/30/2020 03:43 PM       POC Tests: No results for input(s): POCGLU, POCNA, POCK, POCCL, POCBUN, POCHEMO, POCHCT in the last 72 hours. Coags: No results found for: PROTIME, INR, APTT    HCG (If Applicable): No results found for: PREGTESTUR, PREGSERUM, HCG, HCGQUANT     ABGs: No results found for: PHART, PO2ART, ICG3TGS, JSU4ODR, BEART, W1VEVKZV     Type & Screen (If Applicable):  No results found for: LABABO, LABRH    Drug/Infectious Status (If Applicable):  No results found for: HIV, HEPCAB    COVID-19 Screening (If Applicable): No results found for: COVID19        Anesthesia Evaluation  Patient summary reviewed and Nursing notes reviewed  Airway: Mallampati: II  TM distance: >3 FB   Neck ROM: full  Mouth opening: > = 3 FB   Dental: normal exam         Pulmonary:Negative Pulmonary ROS and normal exam  breath sounds clear to auscultation                             Cardiovascular:Negative CV ROS  Exercise tolerance: good (>4 METS),           Rhythm: regular  Rate: normal                    Neuro/Psych:   Negative Neuro/Psych ROS  (+) neuromuscular disease (Chronic Back Pain on Methadone):,             GI/Hepatic/Renal: Neg GI/Hepatic/Renal ROS            Endo/Other: Negative Endo/Other ROS                    Abdominal:             Vascular: negative vascular ROS. Other Findings:           Anesthesia Plan      TIVA     ASA 2     (Severe Scoliosis - Good neck extension)  Induction: intravenous. Anesthetic plan and risks discussed with patient.               Post-op pain plan if not by surgeon: single peripheral nerve block            Serg Greer MD   9/26/2022

## 2022-09-27 NOTE — OP NOTE
OPERATIVE NOTE    Natalia Wolfe     male  [id number]  9/26/2022    PRE-OP DIAGNOSIS: Other closed intra-articular fracture of distal end of left radius, initial encounter [F21.867M]       POST-OP DIAGNOSIS: Same    LATERALITY: Left    PROCEDURES PERFORMED:  Open treatment of Left intra-articular distal radius fracture with internal fixation of three or more fragments CPT 18236      SURGEON:  Jadiel Sun MD    IMPLANTS:  Implant Name Type Inv. Item Serial No.  Lot No. LRB No. Used Action   PLATE BNE 3 H L DST RAD VOLAR TI STD FOR 3.5MM SCR GEMINUS - [id number]  PLATE BNE 3 H L DST RAD VOLAR TI STD FOR 3.5MM SCR GEMINUS  SKELET[employer]YNAM[employer] [facility] [id number] Left 1 Implanted   PEG BNE FIX L20MM DIA2. 3MM DST RAD TI THRD ROSELIA FOR VOLAR - [id number]  PEG BNE FIX L20MM DIA2. 3MM DST RAD TI THRD ROSELIA FOR VOLAR  SKELET[employer]YNAM[employer] [facility] [id number] Left 3 Implanted   PEG BNE FIX L18MM DIA2. 3MM DST RAD TI THRD ROSELIA FOR VOLAR - [id number]  PEG BNE FIX L18MM DIA2. 3MM DST RAD TI THRD ROSELIA FOR VOLAR  SKELET[employer]YNAMICS [facility] [id number] Left 1 Implanted   PEG BNE FIX L24MM DIA2. 3MM DST RAD TI THRD ROSELIA FOR VOLAR - [id number]  PEG BNE FIX L24MM DIA2. 3MM DST RAD TI THRD ROSELIA FOR VOLAR  SKELETAL DYNAMICS [facility] [id number] Left 1 Implanted   PEG BNE FIX L22MM DIA2. 3MM DST RAD TI THRD ROSELIA FOR VOLAR - [id number]  PEG BNE FIX L22MM DIA2. 3MM DST RAD TI THRD ROSELIA FOR VOLAR  SKELET[employer]YNAM[employer] [facility] [id number] Left 1 Implanted   SCREW BNE L28MM DIA2.5MM DST RAD VOLAR CO CHROM POLYAX ROSELIA - [id number]  SCREW BNE L28MM DIA2.5MM DST RAD VOLAR CO CHROM POLYAX ROSELIA  SKELETAL DYNAMICS [facility] [id number] Left 1 Implanted   SCREW BNE L14MM DIA3.5MM JASON DST RAD TI ROSELIA FOR VOLAR - [id number]  SCREW BNE L14MM DIA3.5MM JASON DST RAD TI ROSELIA FOR VOLAR  [employer] [id number] Left 1 Implanted   SCREW BONE L16MM DIA3.5MM JASON DSTL RAD TI LCK FOR VOLAR - [id number]  SCREW BONE L16MM DIA3.5MM JASON DSTL RAD TI LCK FOR VOLAR [employer] [facility]- [id number] Left 1 Implanted   SCREW BNE L14MM DIA3.5MM JASON DST RAD VOLAR TI NONLOCKING - [id number]  SCREW BNE L14MM DIA3.5MM JASON DST RAD VOLAR TI NONLOCKING  [employer] [facility]- [id number] Left 1 Implanted   SCREW BONE L15MM DIA3.5MM JASON DSTL RAD TI LCK FOR VOLAR - [id number]  SCREW BONE L15MM DIA3.5MM JASON DSTL RAD TI LCK FOR VOLAR  SKELETAL DYNAMICS [facility] [id number] Left 1 Implanted   SCREW BONE L15MM DIA3.5MM JASON DSTL RAD VOLAR TI NONLOCKING - [id number]  SCREW BONE L15MM DIA3.5MM JASON DSTL RAD VOLAR TI NONLOCKING  [employer] [facility]- [id number] Left 1 Implanted   SCREW BNE L26MM DIA2.5MM DST RAD VOLAR CO CHROM POLYAX ROSELIA - [id number]  SCREW BNE L26MM DIA2.5MM DST RAD VOLAR CO CHROM POLYAX ROSELIA  SKELET[employer]YNAMICS [facility] [id number] Left 1 Implanted       Procedure(s):  LEFT DISTAL RADIUS FRACTURE OPEN REDUCTION INTERNAL FIXATION    Surgeon(s):  Kathi Lion MD    Procedure(s):  LEFT DISTAL RADIUS FRACTURE OPEN REDUCTION INTERNAL FIXATION    ANESTHESIA: Choice    ESTIMATED BLOOD LOSS: Minimal    COMPLICATIONS: None    TOURNIQUET TIME:   Total Tourniquet Time Documented:  Arm  (Left) - 54 minutes  Total: Arm  (Left) - 54 minutes      INDICATION FOR PROCEDURE:  Bautista Glass sustained the above mentioned injuries as a result of a fall. Surgical and non-surgical treatment options were discussed with the patient and their family, as well as the risk and benefits of each option. Together, we decided to proceed with surgical management of their fracture. Specific to this treatment plan, we discussed in detail surgical risks including scar, pain, bleeding, infection, anesthetic risks, neurovascular injury, nonunion, malunion, hardware loosening or failure, need for further surgery,  weakness, stiffness, risk of death and potential risk of other unforseen complication. The Patient did consent to the procedure after discussion of the risks and benefits. DESCRIPTION OF PROCEDURE:  The patient was identified in the holding room. The Left wrist was marked and confirmed as the correct operative site. They were then brought to the OR and general anesthesia was induced. They were transferred onto the OR table in the supine position. All bony prominences were well padded. SCDs were placed on the bilateral legs throughout the case. A timeout was performed, verifying the correct patient, the correct side and the correct procedure. Antibiotics were then administered, and were redosed during the procedure as needed at indicated intervals. A non-sterile tourniquet was placed on the arm    The upper extremity and shoulder girdle was pre scrubbed and then prepped and draped in routine sterile fashion. An incisional timeout was performed re-confirming the correct patient, surgical site and procedure, as well as verifying antibiotics. A volar FCR approach was utilized. Dissection was taken down through skin and subcutaneous tissue. Hemostasis was achieved with bipolar cautery. The FCR sheath was incised and it was retracted ulnarly. The FCR subsheath was incised. The pronator quadratus was then sharply released from the radius in an L-shaped fashion and was retracted ulnarly to expose the distal radius. Dissection was taken down to bone. The three-part intra-articular fracture was identified and inspected. Fracture was attempted to be reduced with manipulation and placement of a [place] elevator into the fracture to unhinge the volar cortex however, this was unsuccessful given that the fracture was over 1weeks old and significant healing had occurred.   Complete dissection on the radial aspect of the fracture including release of the brachioradialis and release of the dorsal periosteum was performed, a [place] elevator was used in lieu of an osteotome and a mallet was used to insert a [place] elevator into the fracture in order to recreate the fracture in the same manner that an osteotomy is done, this part of the procedure certainly increased the difficulty, double the time of the operation and increase the risk of tendon injury including possible attritional rupture of the EPL tendon Reduction was confirmed with fluoroscopy. A distal radius plate was chosen and placed along the volar surface. Increased difficulty and time was also added by the fact that this appeared to be a refracture with increased deformity on the volar aspect of the radius so the volar plate did not fully fit the patient's anatomy, this required position of the plate slightly proximal with screws aimed at the subchondral aspect of the dorsal lunate facet fragment into order to maintain alignment. Once plate position was confirmed, the plate was secured to the bone with a nonlocking screw into the oblong hole. The distal row of screw holes were filled with locking screws of measured length after unicortical drilling. The plate was secured at the shaft with two additional screws, one more nonlocking and one locking screw. At the end of the procedure there was good pronation and supination without blocks or clunks, Shuck test demonstrated stable DRUJ. AP and lateral x-rays were obtained, showing adequate fracture reduction with restoration of height, palmar tilt and radial inclination. Articular wrist view was used after placement of each distal screw to demonstrated that all screws were outside of the joint. Final x-rays were obtained. These showed adequate reduction of the fracture, as well as adequate plate and screw placement and length. The tourniquet was deflated and hemostasis was ensured. The wounds were then thoroughly irrigated and closed in layered fashion with 4-0 vicryl and 4-0 stratafix. A sterile dressing was applied followed by a Volar splint    The patient was awakened and taken to PACU in stable condition. All sponge and needle counts were correct at the end of the case. I was present and scrubbed for the entire procedure.        DISPOSITION : Home    MECHANICAL VTE (DVT) PROPHYLAXIS: sequential compression devices    CHEMICAL VTE (DVT) PROPHYLAXIS: None warranted for this case    WEIGHT BEARING STATUS:   NWB on the Left upper extremity    FOLLOW-UP: in 10-14 days for suture removal.       Maury Sandoval MD, PhD  Orthopaedic Surgery  09/27/22  7:22 AM

## 2022-10-11 ENCOUNTER — OFFICE VISIT (OUTPATIENT)
Dept: ORTHOPEDIC SURGERY | Age: 62
End: 2022-10-11

## 2022-10-11 ENCOUNTER — OFFICE VISIT (OUTPATIENT)
Dept: ORTHOPEDIC SURGERY | Age: 62
End: 2022-10-11
Payer: MEDICARE

## 2022-10-11 DIAGNOSIS — M25.532 LEFT WRIST PAIN: ICD-10-CM

## 2022-10-11 DIAGNOSIS — M25.632 STIFFNESS OF LEFT WRIST JOINT: ICD-10-CM

## 2022-10-11 DIAGNOSIS — S52.572A OTHER INTRAARTICULAR FRACTURE OF LOWER END OF LEFT RADIUS, INITIAL ENCOUNTER FOR CLOSED FRACTURE: Primary | ICD-10-CM

## 2022-10-11 PROCEDURE — 97165 OT EVAL LOW COMPLEX 30 MIN: CPT | Performed by: OCCUPATIONAL THERAPIST

## 2022-10-11 PROCEDURE — 99024 POSTOP FOLLOW-UP VISIT: CPT | Performed by: NURSE PRACTITIONER

## 2022-10-11 PROCEDURE — 97110 THERAPEUTIC EXERCISES: CPT | Performed by: OCCUPATIONAL THERAPIST

## 2022-10-11 RX ORDER — METHYLPREDNISOLONE 4 MG/1
TABLET ORAL
Qty: 1 KIT | Refills: 0 | Status: SHIPPED | OUTPATIENT
Start: 2022-10-11

## 2022-10-11 RX ORDER — CEPHALEXIN 500 MG/1
500 CAPSULE ORAL 3 TIMES DAILY
Qty: 40 CAPSULE | Refills: 0 | Status: SHIPPED | OUTPATIENT
Start: 2022-10-11 | End: 2022-10-18

## 2022-10-11 NOTE — PROGRESS NOTES
Patient will start occupational therapy to avoid stiffness. Therapy will focus on motion, edema control and scar management. Therapy will progress into strengthening phase six weeks after surgery pending my examination at that time. Patient will avoid lifting, pushing or pulling more than 2 lbs until next assessment and XRs in 4 weeks.       MADDY Umana CNP  10/11/22  11:22 AM

## 2022-10-11 NOTE — PROGRESS NOTES
[address] [facility]  [address] [facility] [facility] [address]  [facility] [address]  Dept: [phone]      Occupational Therapy Initial Assessment     Referring MD: Qasim Powell MD    Diagnosis:     ICD-10-CM    1. Other intraarticular fracture of lower end of left radius, initial encounter for closed fracture  S52.572A       2. Left wrist pain  M25.532       3. Stiffness of left wrist joint  M25.632            Surgery/Medical Dx: Date 9/26/22:   Open treatment of Left intra-articular distal radius fracture with internal fixation of three or more fragments     Therapy precautions: Fracture precautions    History of injury/onset : 9/8/22:  tripped and fell    Total Direct Treatment Time: 15 min  Total In Office Time: 30 min    Preferred Name: [address] [place] [address]: History reviewed. No pertinent past medical history. ,   Past Surgical History:   Procedure Laterality Date    BACK SURGERY      KNEE ARTHROSCOPY Left     WRIST FRACTURE SURGERY Left 9/26/2022    LEFT DISTAL RADIUS FRACTURE OPEN REDUCTION INTERNAL FIXATION performed by Guerita Livingston MD at [facility]      Medications. : Reviewed in chart  Allergies: No Known Allergies     SUBJECTIVE     Current Symptoms/Chief complaints:   Chief Complaint   Patient presents with    Wrist Pain           Chief complaint/history of injury:   Date symptoms began: 9/8/22  Barry Ponce of condition:Recent onset (initial onset within last 3 months)  Primary cause of current episode: Post-surgical  How did symptoms start: [address]  Describe current symptoms: wrist pain, swelling, stiffness    Received previous outpatient therapy? No      Pain Assessment:  Pain location: L wrist   Average Pain/symptom intensity (0-10 scale)  Last 24 hours: 2-4/10  Last week (1-7 days): 2-4/10  How often do you feel symptoms?  Constant (% of time)  Description: aching  Aggravating factors: upper body dressing/grooming  Alleviating factors: rest    Social/Functional Hx:  Pt lives lives with their family   Current DME: brace/splint: prefab wrist support  Work Status: Employed part-time:  at [employer]: minimally disturbed  [address] Hx/Interests: Independent and active without physical limitations  Current level of function: requires min assist with BADL's    Neuro screen: Pt denies c/o, numbness, and tingling    Patient Stated Goals: \"To use my hand again\"    OBJECTIVE     Functional Outcome Measures: Quick Dash  42 score=   70.45 % functional deficit  Hand/Side Dominance: right handed  Observation/Posture: Holding UE in protected position  Palpation: Tender L wrist  Swelling/Edema: moderate L wrist  Skin Integrity:  Skin irritated and raw from itching and placement of neosporin prior to today's visit. Patient removed his cast prior to arrival this date. Xeroform placed by MA and patient to perform daily dressing changes. A/PROM MEASUREMENTS  ROM Affected Side   Shoulder Screen WNL                      ROM RIGHT  (AROM) LEFT 10/11/22  (AROM)     Elbow extension/flexion  WNL      Supination/Pronation  65/87     Wrist Extension/Flexion  45/30     RD/UD  5/14        RIGHT  (AROM) LEFT  (AROM) PROM  involved side     Thumb MP ext/flex       Thumb IP ext/flex       Opposition (Arielle [place]):  6       Full fist present left hand      Strength/MMT (0-5 Scale) : Not tested secondary to precautions      Special Tests:  None performed  Evaluation Modifications/Assistance required : None  Degree of assistance provided: none    Treatment:  Initial Evaluation: Low Complexity (74564)      Therapeutic exercise (00295) x 15 min:  Home Exercise Program development and Education: see below. Patient I with program following instruction and performance  Use of heat and ice as needed for pain and inflammation reduction. Precautions reviewed.   OT POC and rationale, education for surgical precautions and pain management, edema management, wound care, proper linn/doff of orthosis    Access Code: [email or web address]  URL: [email or web address]  Date: 10/11/2022  Prepared by: Bobby Chawla    Exercises  Wrist Flexion Extension AROM with Fingers Curled and Palm Down - 5 x daily - 7 x weekly - 2 sets - 15 reps  Seated Forearm Pronation Supination AROM - 5 x daily - 7 x weekly - 2 sets - 15 reps - 3 sec hold  Wrist AROM Radial and Ulnar deviation - 5 x daily - 7 x weekly - 2 sets - 15 reps  Wrist Circumduction AROM - 5 x daily - 7 x weekly - 2 sets - 15 reps    CLINICAL DECISION MAKING/ASSESSMENT     Performance Deficits  Physical: Mobility and Strength  Cognitive: No deficiencies noted  Psychosocial: No deficiencies noted  Rehab potential: good    The patient presents today s/p Open treatment of Left intra-articular distal radius fracture with internal fixation of three or more fragments  . The patient presents with L wrist stiffness, swelling and pain, impaired ADL's . These deficits appear to be secondary to nature of injury and immobilization . Based on these subjective and objective findings, the patient is perceived as currently having a primary functional deficit of  70% dysfunction. During IE, patient initially declined his OTC wrist support, stating that he was not going to wear it. Reviewed precautions and rationale to continue with protection at least 2 more weeks. Patient agreed to wear it between exercise sessions and night. After a brief chart/PMH and OT profile review, evaluation requiring none assistance/modifications and simple patient and data analysis, I have determined the patient exhibits several (1-3) performance deficits. Comorbidities do not affect the patient's occupational performance.  The following performance deficits (including but not limited to physical, cognitive and/or psychosocial components) result in activity limitations and participation restrictions: Mobility and Strength    The patient would benefit from skilled occupational therapy services to address the deficits noted above for return to prior level of function. PLAN OF CARE     Effective Dates: 10/11/2022 TO 12/10/2022 (60 days). Frequency/Duration:  1-2x a week  for 60 Day(s)  Interventions may include but are not limited to: (13893) Therapeutic exercise to develop strength and endurance, range of motion, and flexibility, (09320) Manual Therapy:   Consisting of but not limited to hands on treatment by therapist for connective tissue massage, pain management, edema management, joint mobilization and manipulation, manual traction, passive range of motion, soft tissue and neural system mobilization/manipulation and therapeutic massage., Modalities prn to address pain, spasms, and swelling: (60246) Hot/cold pack  (77522) Fluidotherapy, and Home exercise program (HEP) development    GOALS       Short term goals:  11/1/2022  (3 weeks)  Patient will be I with HEP and use of orthosis. Increase AROM affected wrist flexion to at least 45 ° to improve function  Increase AROM affected wrist extension to at least 55 ° to improve function  Pt will increase active thumb opposition on affected side so pt lacks no more than 1.0 cm to base of fifth digit to improve grasp. Pts Quick DASH functional assessment score will be less than 50 % functional deficit       Long term goals: 12/6/2022  (8 weeks)  Pt will have at least 65 °  of active wrist ext in the affected extremity for ability to perform activities like grooming and typing. Pt will have at least 65 °  of active wrist flexion in the affected extremity to improve function with writing and grooming. Pt will have at least 85 °  of AROM affected forearm supination to improve ability  with tasks like opening doors, hold a plate. Pt will have at least 30 lbs of  strength in hand on affected side to improve ability to grasp and hold an object.   Minimal edema in affected hand/wrist/forearm  Pt will be able to lift and carry items (ie grocery bags, laundry basket etc) with affected UE pain 2 of 10 or less. Pt will be able to sleep through the night with no pain in affected UE.   Pts Quick DASH functional assessment score will be less than 20% functional deficit         [facility]     OT Protocols

## 2022-10-11 NOTE — LETTER
DME Patient Authorization Form    Name: Isi Gil  : 1960  MRN: [id number]   Age: 58 y.o. Gender: male  Delivery Address: [facility]     Diagnosis:     ICD-10-CM    1. Other intraarticular fracture of lower end of left radius, initial encounter for closed fracture  S52.572A XR WRIST LEFT (MIN 3 VIEWS)           Requested DME:  Wrist Lacer- ($65.00) X 1 - left        Clinical Notes:     **Indicates non-covered items by insurance. Payment expected on date of service. Electronically signed by  Provider: MADDY Talley CNP__Date: 10/11/2022                            [address] Tax ID # [id number]        Durable Medical Equipment and/or Orthotics Patient Consent     I understand that my physician has prescribed this medical supply as part of my treatment plan as a matter of Medical Necessity.  I understand that I have a choice in where I receive my prescribed orthopedic supplies and/or services.  I authorize [facility] to furnish this service/product and to provide my insurance carrier with any information requested in order to process for payment.  I instruct my insurance carrier to pay [facility] directly for these services/products.  I understand that my insurance carrier may deny payment for this supply because it is a non-covered item, deemed not medically necessary or considered experimental.   I understand that any cost not covered by my insurance carrier will be solely my financial responsibility.  I have received the Supplier Standards and have reviewed them.  I have received the prescribed item and have been fully instructed on the proper use of the above services/products.    ______ (Patient Initials) I understand that all DME items are non-returnable after being dispensed.  Items still in sealed packaging may be returned up to 14 days after purchasing. [address] will replace items that are defective.    ______ (Patient Initials) I understand that Rory Salas will not file a claim with my insurance carrier for this service/product and I am waiving my right to file a claim on my own for this service/product with my insurance company as this item is NON-COVERED (Denoted by the **) by my Insurance company/policy. ______ (Patient Initials) I understand that I am responsible to bring my equipment to the hospital for any surgery. ______________________________________________  ________________________  Patient / Haileysameera Solorio            Thank you for considering [address] Your physician has prescribed specific medical equipment or devices for your home use. The following describes your rights and responsibilities as our customer. Right to Choose Providers: You have a choice regarding which company supplies your home medical equipment and devices, and to consult your physician in this decision. You may choose a medical supply store, a home medical equipment provider, or a specialist such as [facility]. [facility] will coordinate with your physician to provide the medical equipment or devices prescribed for your home use. Right to Service:  You have the right to considerate, respectful and nondiscriminatory care. You have the right to receive accurate and easily understood information about your health care. If you speak a foreign language, or don't understand the discussions, assistance will be provided to allow you to make informed health care decisions. You have the right to know your treatment options and to participate in decisions about your care, including the right to accept or refuse treatment.   You have the right to expect a reasonable response to your requests for treatment or service. You have the right to talk in confidence with health care providers and to have your health care information protected. You have the right to receive an explanation of your bill. You have the right to complain about the service or product you receive. Patient Responsibilities:  Please provide complete and accurate information about your health insurance benefits and make arrangements for the timely payment of your bill. [facility] will, if possible, assume responsibility for billing your insurance (Medicare, Medicaid and commercial) for the prescribed equipment or devices. If your policy does not cover the prescribed product, or only covers a portion of the bill, you are responsible for any remaining balance. Return and Exchange Policy:  [facility] will honor published  Warranties for products. [facility] will accept returns or exchanges within 14 days from the date of receipt, providin) the product must be in new condition; 2) receipt as required; and 3) used disposable and hygiene products may only be returned due to a defective product. Note: Refunds will be issued in a timely manner, please allow 4-6 weeks for processing. Complaint Procedures and DME Consumer Protection Resources:  [facility] values you as a customer, and is committed to resolving patient concerns. This commitment includes understanding and documenting your concerns, conducting a review of internal procedures, and providing you with an explanation and resolution to your concerns. Should you have any questions about our services or billing process, please contact our office at (practice phone number). If we are unable to resolve the concern, you have the right to direct comments to the office of Consumer Protection, in the [address] or the [facility] office, without fear of repercussion.     DMEPOS SUPPLIER STANDARDS    A supplier must be in compliance with all applicable Federal and State licensure and regulatory requirements. A supplier must provide complete and accurate information on the DMEPOS supplier application. Any changes to this information must be reported to the [facility] within 30 days. An authorized individual (one whose signature is binding) must sign the application for billing privileges. A supplier must fill orders from its own inventory, or must contract with other companies for the purchase of items necessary to fill the order. A supplier may not contract with any entity that is currently excluded from the Medicare program, any [facility] program, or from any other Federal procurement or Nonprocurement programs. A supplier must advise beneficiaries that they may rent or purchase inexpensive or routinely purchased durable medical equipment, and of the purchase option for capped rental equipment. A supplier must notify beneficiaries of warranty coverage and honor all warranties under applicable State Law, and repair or replace free of charge Medicare covered items that are under warranty. A supplier must maintain a physical facility on an appropriate site. A supplier must permit CMS, or its agents to conduct on-site inspections to ascertain the supplier's compliance with these standards. The supplier location must be accessible to beneficiaries during reasonable business hours, and must maintain a visible sign and posted hours of operation. A supplier must maintain a primary business telephone listed under the name of the business in a [employer] or a toll free number available through directory assistance. The exclusive use of a beeper, answering machine or cell phone is prohibited. A supplier must have comprehensive liability insurance in the amount of at least $300,000 that covers both the supplier's place of business and all customers and employees of the supplier.   If the supplier manufactures its own items, this insurance must also cover product liability and completed operations. A supplier must agree not to initiate telephone contact with beneficiaries, with a few exceptions allowed. This standard prohibits suppliers from calling beneficiaries in order to solicit new business. A supplier is responsible for delivery and must instruct beneficiaries on use of Medicare covered items, and maintain proof of delivery. A supplier must answer questions, and respond to complaints of the beneficiaries, and maintain documentation of such contacts. A supplier must maintain and replace at no charge or repair directly, or through a service contract with another company, Medicare covered items it has rented to beneficiaries. A supplier must accept returns of substandard (less than full quality for the particular item) or unsuitable items (inappropriate for the beneficiary at the time it was fitted and rented or sold) from beneficiaries. A supplier must disclose these supplier standards to each beneficiary to whom it supplies a Medicare-covered item. A supplier must disclose to the government any person having ownership, financial, or control interest in the supplier. A supplier must not convey or reassign a supplier number; i.e., the supplier may not sell or allow another entity to use its Medicare billing number. A supplier must have a complaint resolution protocol established to address beneficiary complaints that relate to these standards. A record of these complaints must be maintained at the physical facility. Complaint records must include: the name, address, telephone number and health insurance claim number of the beneficiary, a summary of the complaint, and any action taken to resolve it. A supplier must agree to furnish CMS any information required by the Medicare statute and implementing regulations.   A supplier of DMEPOS and other items and services must be accredited by a CMS-approved accreditation organization in order to receive and retain a supplier billing number. The accreditation must indicate the specific products and services, for which the supplier is accredited in order for the supplier to receive payment for those specific products and services. A DMEPOS supplier must notify their accreditation organization when a new DMEPOS location is opened. The accreditation organization may accredit the new supplier location for three months after it is operational without requiring a new site visit. All DMEPOS supplier locations, whether owned or subcontracted, must meet the [employer] and be separately accredited in order to bill Medicare. An accredited supplier may be denied enrollment or their enrollment may be revoked, if CMS determines that they are not in compliance with the DMEPOS quality standards. A DMEPOS supplier must disclose upon enrollment all products and services, including the addition of new product lines for which they are seeking accreditation. If a new product line is added after enrollment, the DMEPOS supplier will be responsible for notifying the accrediting body of the new product so that the DMEPOS supplier can be re-surveyed and accredited for these new products. Must meet the surety bond requirements specified in 42 C. F.R. 424.57(c). Implementation date- May 4, 2009. A supplier must obtain oxygen from a state-licensed oxygen supplier. A supplier must maintain ordering and referring documentation consistent with provisions found in 42 C. F.R. 424.516(f). DMEPOS suppliers are prohibited from sharing a practice location with certain other Medicare providers and suppliers. DMEPOS suppliers must remain open to the public for a minimum of 30 hours per week with certain exceptions.

## 2022-10-12 NOTE — PROGRESS NOTES
improved since his last visit. He did not have his splint on. Applied Telfa, gauze wrap and stockinette to left wrist.     PLAN OF CARE     AROM left wrist.    GOALS       Short term goals:  11/1/2022  (3 weeks)  Patient will be I with HEP and use of orthosis. Increase AROM affected wrist flexion to at least 45 ° to improve function  Increase AROM affected wrist extension to at least 55 ° to improve function  Pt will increase active thumb opposition on affected side so pt lacks no more than 1.0 cm to base of fifth digit to improve grasp. Pts Quick DASH functional assessment score will be less than 50 % functional deficit      Long term goals: 12/6/2022  (8 weeks)  Pt will have at least 65 °  of active wrist ext in the affected extremity for ability to perform activities like grooming and typing. Pt will have at least 65 °  of active wrist flexion in the affected extremity to improve function with writing and grooming. Pt will have at least 85 °  of AROM affected forearm supination to improve ability  with tasks like opening doors, hold a plate. Pt will have at least 30 lbs of  strength in hand on affected side to improve ability to grasp and hold an object. Minimal edema in affected hand/wrist/forearm  Pt will be able to lift and carry items (ie grocery bags, laundry basket etc) with affected UE pain 2 of 10 or less. Pt will be able to sleep through the night with no pain in affected UE. Pts Quick DASH functional assessment score will be less than 20% functional deficit         [facility]   Access Code: [email or web address]  URL: [email or web address]  Date: 10/11/2022  Prepared by: Joseph Fletcher    Exercises  Wrist Flexion Extension AROM with Fingers Curled and Palm Down - 5 x daily - 7 x weekly - 2 sets - 15 reps  Seated Forearm Pronation Supination AROM - 5 x daily - 7 x weekly - 2 sets - 15 reps - 3 sec hold  Wrist AROM Radial and Ulnar deviation - 5 x daily - 7 x weekly - 2 sets - 15 reps  Wrist Circumduction AROM - 5 x daily - 7 x weekly - 2 sets - 15 reps      OT Protocols

## 2022-10-13 ENCOUNTER — OFFICE VISIT (OUTPATIENT)
Dept: ORTHOPEDIC SURGERY | Age: 62
End: 2022-10-13
Payer: MEDICARE

## 2022-10-13 DIAGNOSIS — S52.572A OTHER INTRAARTICULAR FRACTURE OF LOWER END OF LEFT RADIUS, INITIAL ENCOUNTER FOR CLOSED FRACTURE: Primary | ICD-10-CM

## 2022-10-13 DIAGNOSIS — M25.632 STIFFNESS OF LEFT WRIST JOINT: ICD-10-CM

## 2022-10-13 DIAGNOSIS — M25.532 LEFT WRIST PAIN: ICD-10-CM

## 2022-10-13 PROCEDURE — 97110 THERAPEUTIC EXERCISES: CPT | Performed by: OCCUPATIONAL THERAPIST

## 2022-10-18 ENCOUNTER — OFFICE VISIT (OUTPATIENT)
Dept: ORTHOPEDIC SURGERY | Age: 62
End: 2022-10-18
Payer: MEDICARE

## 2022-10-18 DIAGNOSIS — M40.10: ICD-10-CM

## 2022-10-18 DIAGNOSIS — T14.90XS: ICD-10-CM

## 2022-10-18 DIAGNOSIS — M54.6 THORACIC BACK PAIN, UNSPECIFIED BACK PAIN LATERALITY, UNSPECIFIED CHRONICITY: Primary | ICD-10-CM

## 2022-10-18 DIAGNOSIS — T84.216A HARDWARE FAILURE OF ANTERIOR COLUMN OF SPINE (HCC): ICD-10-CM

## 2022-10-18 DIAGNOSIS — Z98.1 HISTORY OF THORACIC SPINAL FUSION: ICD-10-CM

## 2022-10-18 PROCEDURE — G8427 DOCREV CUR MEDS BY ELIG CLIN: HCPCS | Performed by: PHYSICIAN ASSISTANT

## 2022-10-18 PROCEDURE — G8484 FLU IMMUNIZE NO ADMIN: HCPCS | Performed by: PHYSICIAN ASSISTANT

## 2022-10-18 PROCEDURE — 4004F PT TOBACCO SCREEN RCVD TLK: CPT | Performed by: PHYSICIAN ASSISTANT

## 2022-10-18 PROCEDURE — 99205 OFFICE O/P NEW HI 60 MIN: CPT | Performed by: PHYSICIAN ASSISTANT

## 2022-10-18 PROCEDURE — 3017F COLORECTAL CA SCREEN DOC REV: CPT | Performed by: PHYSICIAN ASSISTANT

## 2022-10-18 PROCEDURE — G8420 CALC BMI NORM PARAMETERS: HCPCS | Performed by: PHYSICIAN ASSISTANT

## 2022-10-18 NOTE — Clinical Note
Refer to [employer] [facility]. I called pt. No answer.   I left patient a message to call us back so we can notify him we are making referral.

## 2022-10-18 NOTE — LETTER
Laura NOVA JOYA  1960  MRN [id number]                                              ROOM NUMBER______      Radiographic Studies:    Cervical MRI      Thoracic MRI         Lumbar MRI          Pelvis MRI        CONTRAST    CT Myelogram: _______________   NCS/EMG ________________ ( UE  /  LE )     MRI of ___________________          Other: ____________________      Injections:    KNEE    HIP  Depomedrol _____ mg Euflexxa _____    _______________ TFESI/SNRB  _______________ SI Joint  _______________ MELLO    _______________ Facet  _______________Piriformis/ Sciatica      Medications:    Oral Steroids _______________  NSAIDS _______________    Muscle Relaxers _______________  Neurontin/Lyrica _______________    Pain Medicine _______________  Other _______________                       Physical Therapy:    Lumbar     Thoracic      Cervical     Hip       Knee       Shoulder               Traction          Ultrasound          Dry Needling      Referral:    Pain referral:  [facility]   [facility]   Other: ______________________________    Follow-up/ Refer__________________________________________________    Authorization to hold blood thinners:___________________________________

## 2022-10-19 ENCOUNTER — OFFICE VISIT (OUTPATIENT)
Dept: ORTHOPEDIC SURGERY | Age: 62
End: 2022-10-19

## 2022-10-19 DIAGNOSIS — S52.572A OTHER INTRAARTICULAR FRACTURE OF LOWER END OF LEFT RADIUS, INITIAL ENCOUNTER FOR CLOSED FRACTURE: Primary | ICD-10-CM

## 2022-10-19 PROCEDURE — 99024 POSTOP FOLLOW-UP VISIT: CPT | Performed by: NURSE PRACTITIONER

## 2022-10-19 NOTE — PROGRESS NOTES
Orthopaedic Hand Clinic Note    Name: Gomez Councilman  YOB: 1960  Gender: male  MRN: [id number]      Follow up visit:   1. Other intraarticular fracture of lower end of left radius, initial encounter for closed fracture        HPI: Gomez Councilman is a 58 y.o. male who is following up for wound check from ORIF left wrist.  He was here last week for his postop appointment. He comes in today for us to check his incision/wound. He does not have his brace on today. ROS/Meds/PSH/PMH/FH/SH: I personally reviewed the patients standard intake form. Pertinents are discussed in the HPI    Physical Examination:    Musculoskeletal Examination:  Examination on the left upper extremity demonstrates cap refill < 5 seconds in all fingers,  Patient's incision/wound looks much better today. There is minimal erythema. No drainage. There are 2 scabs in place. Patient is neurovascular intact with good capillary refill. .    Imaging / Electrodiagnostic Tests:     X-rays include a 3 view left wrist are reviewed. Fractures maintaining alignment. Hardware is in good position. Assessment:     ICD-10-CM    1. Other intraarticular fracture of lower end of left radius, initial encounter for closed fracture  S52.572A XR WRIST LEFT (MIN 3 VIEWS)          Plan:   We discussed the diagnosis and different treatment options. We discussed observation, therapy, antiinflammatory medications and other pertinent treatment modalities. After discussing in detail the patient elects to proceed with leaving his incision/wound open to air. He is allowed to shower with warm soapy water. We discussed the importance of wearing his brace. He will continue therapy. I will see him back in 3 weeks. This will be a 6-week terry from surgery. We will get new x-rays. .     Patient voiced accordance and understanding of the information provided and the formulated plan.  All questions were answered to the patient's satisfaction during the encounter.     Treatment at this time: Brace and therapy    MADDY Rosenthal - CNP  Orthopaedic Surgery  10/19/22  10:39 AM
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
## 2022-10-31 ENCOUNTER — TELEPHONE (OUTPATIENT)
Dept: ORTHOPEDIC SURGERY | Age: 62
End: 2022-10-31

## 2022-10-31 DIAGNOSIS — T14.90XS: ICD-10-CM

## 2022-10-31 DIAGNOSIS — T84.216A HARDWARE FAILURE OF ANTERIOR COLUMN OF SPINE (HCC): ICD-10-CM

## 2022-10-31 DIAGNOSIS — Z98.1 HISTORY OF THORACIC SPINAL FUSION: ICD-10-CM

## 2022-10-31 DIAGNOSIS — M40.10: ICD-10-CM

## 2022-10-31 DIAGNOSIS — M54.6 THORACIC BACK PAIN, UNSPECIFIED BACK PAIN LATERALITY, UNSPECIFIED CHRONICITY: Primary | ICD-10-CM

## 2022-10-31 NOTE — TELEPHONE ENCOUNTER
Spoke with patient and reassured him that the referral to [facility] with Dr. Evette Gardner has been sent. I instructed him to wait for their phone call.

## 2022-11-30 ENCOUNTER — CLINICAL DOCUMENTATION (OUTPATIENT)
Dept: ORTHOPEDIC SURGERY | Age: 62
End: 2022-11-30

## 2022-11-30 DIAGNOSIS — M25.532 LEFT WRIST PAIN: Primary | ICD-10-CM

## 2022-11-30 NOTE — PROGRESS NOTES
[address] [facility]  [address] [facility] [address]  [facility] [address]  Dept: [phone]      Occupational Therapy Discharge/Discontinuation Note     Referring MD: Dr. Anette Pierson  Date of Initial Evaluation: 10/11/22   Number of visits: 2  Number of cancellations:   Number of no shows: 1    Patient has been discharged from therapy based on Patient noncompliant. Patient was last seen for OT services on 10/13/2022. At that time, the patient appeared to be gradually progressing with therapy treatment. Therapy goals were partially met and patient demonstrated 50 recall of HEP. Please see previous notes for objective findings.

## (undated) DEVICE — SUTURE VCRL SZ 4-0 L27IN ABSRB UD L19MM PS-2 3/8 CIR PRIM J426H

## (undated) DEVICE — BIT DRL L40MM DIA2.5MM SLD SIDE CUT FOR GEMINUS VOLAR DST

## (undated) DEVICE — TUBING, SUCTION, 1/4" X 10', STRAIGHT: Brand: MEDLINE

## (undated) DEVICE — K WIRE FIX L127MM DIA1.5MM DST VOLAR RAD STD TIP GEMINUS
Type: IMPLANTABLE DEVICE | Site: WRIST | Status: NON-FUNCTIONAL
Removed: 2022-09-26

## (undated) DEVICE — DISPOSABLE BIPOLAR CODE, 12' (3.66 M): Brand: CONMED

## (undated) DEVICE — GLOVE ORANGE PI 7 1/2   MSG9075

## (undated) DEVICE — PADDING CAST W3INXL4YD COT BLEND MIC PLEAT UNDERCAST SPEC

## (undated) DEVICE — BIT DRL L40MM DIA2MM CANN POLYAX LOK SCR FOR DST VOLAR RAD

## (undated) DEVICE — DRIVER SURG UNIV QUIK CONN T10 FOR VOLAR DST RAD PLATING

## (undated) DEVICE — DRIVER SURG SQ TIP DIA2MM PEG TORQ LIMITING FOR GEMINUS

## (undated) DEVICE — ZIMMER® STERILE DISPOSABLE TOURNIQUET CUFF WITH PLC, DUAL PORT, SINGLE BLADDER, 18 IN. (46 CM)

## (undated) DEVICE — PENCIL ES L3M BTTN SWCH HOLSTER W/ BLDE ELECTRD EDGE

## (undated) DEVICE — SUTURE STRATAFIX SPRL MCRYL + SZ 4-0 L12IN ABSRB UD PS-2 SXMP1B117

## (undated) DEVICE — BNDG,ELSTC,MATRIX,STRL,3"X5YD,LF,HOOK&LP: Brand: MEDLINE

## (undated) DEVICE — C-ARM: Brand: UNBRANDED

## (undated) DEVICE — BIT DRL L40MM DIA2MM SLD SIDE CUT FOR GEMINUS VOLAR DST RAD

## (undated) DEVICE — SUTURE VCRL SZ 3-0 L27IN ABSRB UD L26MM SH 1/2 CIR J416H

## (undated) DEVICE — SUTURE NONABSORBABLE MONOFILAMENT 4-0 PS-2 18 IN BLU PROLENE 8682H

## (undated) DEVICE — PADDING CAST COHESIVE 4 YDX3 IN HND TEARABLE COTTON SPEC 100

## (undated) DEVICE — GUIDE SURG DIA1.5MM AIMING FOR GEMINUS VOLAR DST RAD

## (undated) DEVICE — HAND PACK: Brand: MEDLINE INDUSTRIES, INC.

## (undated) DEVICE — SOLUTION IRRIG 1000ML 09% SOD CHL USP PIC PLAS CONTAINER